# Patient Record
Sex: MALE | Race: WHITE | NOT HISPANIC OR LATINO | Employment: OTHER | URBAN - METROPOLITAN AREA
[De-identification: names, ages, dates, MRNs, and addresses within clinical notes are randomized per-mention and may not be internally consistent; named-entity substitution may affect disease eponyms.]

---

## 2023-12-30 PROCEDURE — 87661 TRICHOMONAS VAGINALIS AMPLIF: CPT

## 2023-12-30 PROCEDURE — 87800 DETECT AGNT MULT DNA DIREC: CPT

## 2023-12-30 PROCEDURE — 87591 N.GONORRHOEAE DNA AMP PROB: CPT

## 2023-12-30 PROCEDURE — 87491 CHLMYD TRACH DNA AMP PROBE: CPT

## 2023-12-31 ENCOUNTER — LAB REQUISITION (OUTPATIENT)
Dept: LAB | Facility: HOSPITAL | Age: 63
End: 2023-12-31
Payer: COMMERCIAL

## 2023-12-31 DIAGNOSIS — R39.12 POOR URINARY STREAM: ICD-10-CM

## 2023-12-31 LAB
C TRACH RRNA SPEC QL NAA+PROBE: NEGATIVE
N GONORRHOEA DNA SPEC QL PROBE+SIG AMP: NEGATIVE
T VAGINALIS RRNA SPEC QL NAA+PROBE: NEGATIVE

## 2024-01-19 ENCOUNTER — HOSPITAL ENCOUNTER (OUTPATIENT)
Dept: RADIOLOGY | Facility: CLINIC | Age: 64
Discharge: HOME | End: 2024-01-19
Payer: COMMERCIAL

## 2024-01-19 DIAGNOSIS — R05.8 PRODUCTIVE COUGH: ICD-10-CM

## 2024-01-19 PROCEDURE — 71046 X-RAY EXAM CHEST 2 VIEWS: CPT | Performed by: RADIOLOGY

## 2024-01-19 PROCEDURE — 71046 X-RAY EXAM CHEST 2 VIEWS: CPT

## 2025-01-13 ENCOUNTER — APPOINTMENT (OUTPATIENT)
Dept: PRIMARY CARE | Facility: CLINIC | Age: 65
End: 2025-01-13
Payer: COMMERCIAL

## 2025-01-13 VITALS
HEIGHT: 68 IN | BODY MASS INDEX: 27.58 KG/M2 | WEIGHT: 182 LBS | DIASTOLIC BLOOD PRESSURE: 78 MMHG | SYSTOLIC BLOOD PRESSURE: 128 MMHG | HEART RATE: 59 BPM | TEMPERATURE: 97.9 F | OXYGEN SATURATION: 97 %

## 2025-01-13 DIAGNOSIS — Z21 HIV INFECTION, UNSPECIFIED SYMPTOM STATUS: Primary | ICD-10-CM

## 2025-01-13 DIAGNOSIS — F41.9 ANXIETY: ICD-10-CM

## 2025-01-13 DIAGNOSIS — Z12.11 SCREENING FOR COLON CANCER: ICD-10-CM

## 2025-01-13 DIAGNOSIS — Z12.5 PROSTATE CANCER SCREENING: ICD-10-CM

## 2025-01-13 DIAGNOSIS — G25.81 RLS (RESTLESS LEGS SYNDROME): ICD-10-CM

## 2025-01-13 DIAGNOSIS — E78.2 MIXED HYPERLIPIDEMIA: ICD-10-CM

## 2025-01-13 PROCEDURE — 3008F BODY MASS INDEX DOCD: CPT | Performed by: FAMILY MEDICINE

## 2025-01-13 PROCEDURE — 99203 OFFICE O/P NEW LOW 30 MIN: CPT | Performed by: FAMILY MEDICINE

## 2025-01-13 RX ORDER — BICTEGRAVIR SODIUM, EMTRICITABINE, AND TENOFOVIR ALAFENAMIDE FUMARATE 50; 200; 25 MG/1; MG/1; MG/1
1 TABLET ORAL
COMMUNITY
Start: 2024-11-06

## 2025-01-13 RX ORDER — FINASTERIDE 1 MG/1
1 TABLET, FILM COATED ORAL DAILY
COMMUNITY

## 2025-01-13 RX ORDER — TADALAFIL 5 MG/1
TABLET ORAL
COMMUNITY

## 2025-01-13 RX ORDER — SIMVASTATIN 40 MG/1
TABLET, FILM COATED ORAL
COMMUNITY

## 2025-01-13 RX ORDER — PRAMIPEXOLE DIHYDROCHLORIDE 1.5 MG/1
1.5 TABLET ORAL NIGHTLY
COMMUNITY

## 2025-01-13 RX ORDER — PAROXETINE 10 MG/1
15 TABLET, FILM COATED ORAL DAILY
COMMUNITY

## 2025-01-13 ASSESSMENT — PATIENT HEALTH QUESTIONNAIRE - PHQ9
SUM OF ALL RESPONSES TO PHQ9 QUESTIONS 1 AND 2: 0
2. FEELING DOWN, DEPRESSED OR HOPELESS: NOT AT ALL
1. LITTLE INTEREST OR PLEASURE IN DOING THINGS: NOT AT ALL

## 2025-01-13 NOTE — PROGRESS NOTES
"Subjective   Patient ID: Drew Mckee is a 64 y.o. male who presents for New Patient Visit, medication check, and Medication  refills (EP. Here for restless leg syndrome, anxiety and HIV. Medication check and refills.).  HPI  New patinet- change in ins so needs new PCP  H/o HIV, HL, RLS     Thinks he is due for colonoscopy - last one 2020   Review of Systems  Genl: The patient has been in good health without recent weight change, fevers, or night sweats  CVS: No chest pain, irregular heartbeat, shortness of breath, or swollen ankles  Resp: No cough or difficulty breathing  GI: No change in bowel habits or abdominal pain. No heartburn  : No urinary problems.   Objective   /78 (BP Location: Left arm)   Pulse 59   Temp 36.6 °C (97.9 °F) (Oral)   Ht 1.727 m (5' 8\")   Wt 82.6 kg (182 lb)   SpO2 97%   BMI 27.67 kg/m²    Physical Exam  Alert, well-appearing.    HEENT: OP clear. Sclera white. Pupils equal and round. EACs and TMs clear.    Neck: Supple. No palpable masses. Thyroid was not enlarged.    CVS: RRR, no murmurs. No peripheral edema.    Respiratory: Clear and equal breath sounds.    GI: Soft, nontender, no masses or hepatosplenomegaly.     Assessment/Plan   Diagnoses and all orders for this visit:  HIV infection, unspecified symptom status  -     Referral to Infectious Disease; Future  -     CBC and Auto Differential; Future  -     Comprehensive Metabolic Panel; Future  -     TSH with reflex to Free T4 if abnormal; Future  RLS (restless legs syndrome)  -     CBC and Auto Differential; Future  -     Comprehensive Metabolic Panel; Future  -     TSH with reflex to Free T4 if abnormal; Future  Anxiety  Mixed hyperlipidemia  -     Lipid Panel; Future  Prostate cancer screening  -     Prostate Specific Antigen, Screen; Future  Screening for colon cancer  -     Colonoscopy Screening; Average Risk Patient; Future      Shannan Poon MD  Family Medicine   [facility]  "

## 2025-01-14 ENCOUNTER — LAB (OUTPATIENT)
Dept: LAB | Facility: LAB | Age: 65
End: 2025-01-14
Payer: COMMERCIAL

## 2025-01-14 DIAGNOSIS — Z21 HIV INFECTION, UNSPECIFIED SYMPTOM STATUS: ICD-10-CM

## 2025-01-14 DIAGNOSIS — Z12.5 PROSTATE CANCER SCREENING: ICD-10-CM

## 2025-01-14 DIAGNOSIS — G25.81 RLS (RESTLESS LEGS SYNDROME): ICD-10-CM

## 2025-01-14 DIAGNOSIS — E78.2 MIXED HYPERLIPIDEMIA: ICD-10-CM

## 2025-01-14 LAB
ALBUMIN SERPL BCP-MCNC: 4.3 G/DL (ref 3.4–5)
ALP SERPL-CCNC: 61 U/L (ref 33–136)
ALT SERPL W P-5'-P-CCNC: 31 U/L (ref 10–52)
ANION GAP SERPL CALC-SCNC: 16 MMOL/L (ref 10–20)
AST SERPL W P-5'-P-CCNC: 28 U/L (ref 9–39)
BASOPHILS # BLD AUTO: 0.02 X10*3/UL (ref 0–0.1)
BASOPHILS NFR BLD AUTO: 0.4 %
BILIRUB SERPL-MCNC: 1 MG/DL (ref 0–1.2)
BUN SERPL-MCNC: 15 MG/DL (ref 6–23)
CALCIUM SERPL-MCNC: 9.7 MG/DL (ref 8.6–10.6)
CHLORIDE SERPL-SCNC: 103 MMOL/L (ref 98–107)
CHOLEST SERPL-MCNC: 162 MG/DL (ref 0–199)
CHOLESTEROL/HDL RATIO: 3.4
CO2 SERPL-SCNC: 24 MMOL/L (ref 21–32)
CREAT SERPL-MCNC: 1.2 MG/DL (ref 0.5–1.3)
EGFRCR SERPLBLD CKD-EPI 2021: 68 ML/MIN/1.73M*2
EOSINOPHIL # BLD AUTO: 0.16 X10*3/UL (ref 0–0.7)
EOSINOPHIL NFR BLD AUTO: 3 %
ERYTHROCYTE [DISTWIDTH] IN BLOOD BY AUTOMATED COUNT: 12.4 % (ref 11.5–14.5)
GLUCOSE SERPL-MCNC: 96 MG/DL (ref 74–99)
HCT VFR BLD AUTO: 45 % (ref 41–52)
HDLC SERPL-MCNC: 47.9 MG/DL
HGB BLD-MCNC: 15.1 G/DL (ref 13.5–17.5)
IMM GRANULOCYTES # BLD AUTO: 0.01 X10*3/UL (ref 0–0.7)
IMM GRANULOCYTES NFR BLD AUTO: 0.2 % (ref 0–0.9)
LDLC SERPL CALC-MCNC: 92 MG/DL
LYMPHOCYTES # BLD AUTO: 2.57 X10*3/UL (ref 1.2–4.8)
LYMPHOCYTES NFR BLD AUTO: 48 %
MCH RBC QN AUTO: 31.9 PG (ref 26–34)
MCHC RBC AUTO-ENTMCNC: 33.6 G/DL (ref 32–36)
MCV RBC AUTO: 95 FL (ref 80–100)
MONOCYTES # BLD AUTO: 0.41 X10*3/UL (ref 0.1–1)
MONOCYTES NFR BLD AUTO: 7.7 %
NEUTROPHILS # BLD AUTO: 2.18 X10*3/UL (ref 1.2–7.7)
NEUTROPHILS NFR BLD AUTO: 40.7 %
NON HDL CHOLESTEROL: 114 MG/DL (ref 0–149)
NRBC BLD-RTO: 0 /100 WBCS (ref 0–0)
PLATELET # BLD AUTO: 150 X10*3/UL (ref 150–450)
POTASSIUM SERPL-SCNC: 4.5 MMOL/L (ref 3.5–5.3)
PROT SERPL-MCNC: 6.8 G/DL (ref 6.4–8.2)
PSA SERPL-MCNC: 0.39 NG/ML
RBC # BLD AUTO: 4.74 X10*6/UL (ref 4.5–5.9)
SODIUM SERPL-SCNC: 138 MMOL/L (ref 136–145)
TRIGL SERPL-MCNC: 111 MG/DL (ref 0–149)
TSH SERPL-ACNC: 1.33 MIU/L (ref 0.44–3.98)
VLDL: 22 MG/DL (ref 0–40)
WBC # BLD AUTO: 5.4 X10*3/UL (ref 4.4–11.3)

## 2025-01-14 PROCEDURE — 85025 COMPLETE CBC W/AUTO DIFF WBC: CPT

## 2025-01-14 PROCEDURE — 80053 COMPREHEN METABOLIC PANEL: CPT

## 2025-01-14 PROCEDURE — 80061 LIPID PANEL: CPT

## 2025-01-14 PROCEDURE — 84443 ASSAY THYROID STIM HORMONE: CPT

## 2025-01-14 PROCEDURE — 84153 ASSAY OF PSA TOTAL: CPT

## 2025-01-17 ENCOUNTER — TELEPHONE (OUTPATIENT)
Dept: IMMUNOLOGY | Facility: CLINIC | Age: 65
End: 2025-01-17
Payer: COMMERCIAL

## 2025-01-17 NOTE — TELEPHONE ENCOUNTER
Time Spent: 30 mins   EIS reached out to pt to return call from 01/16/2025. Pt available to answer call.  Pt requests to transfer care from [facility] d/t new insurance coverage   Pt stated best NPV availability beginning 02/17/2024   EIS called [facility] ID MD (Dr. KAYLA Roberson) office to request refill   [facility] ID  confirmed pt rx sent [facility]   EIS called pt to confirm medication pickup   Pt current rx sent to [facility]  Pt insurance can accept rx thorugh [facility]   Pt will f/U with [facility] FERNANDA SINGH request new rx     PLAN:   EIS will f/U with pt to schedule NPV according to pt availability (February 2025)   EIS will f/U to confirm medication pickup   EIS will complete RW

## 2025-01-20 ENCOUNTER — TELEPHONE (OUTPATIENT)
Dept: IMMUNOLOGY | Facility: CLINIC | Age: 65
End: 2025-01-20
Payer: COMMERCIAL

## 2025-01-20 NOTE — TELEPHONE ENCOUNTER
EIS - At Risk  SW received a VM from [facility] DESIREE Zimmer. SW called back and left a VM.    SW spoke with Mesha. Per Mesha, pt has [employer] insurance and would transfer care to . Mesha mentioned that pt's provider Dr. Roberson has sent a 90-day script (Biktarvy) to [facility]. Pt does not have [facility] with [facility] and [facility] SW is not sure if pt uses a copay card.    SW left pt a VM to ensure smooth transition,    Emailed supervisor to notify care transition.    Time: 6mins

## 2025-01-22 ENCOUNTER — TELEPHONE (OUTPATIENT)
Dept: PRIMARY CARE | Facility: CLINIC | Age: 65
End: 2025-01-22
Payer: COMMERCIAL

## 2025-01-22 NOTE — TELEPHONE ENCOUNTER
Pt called stating he was seen in office on 01/13/24 and his refills were not sent to [facility]. Please send.  Pt has a scheduled appt on 07/17/25.

## 2025-01-23 ENCOUNTER — PATIENT MESSAGE (OUTPATIENT)
Dept: PRIMARY CARE | Facility: CLINIC | Age: 65
End: 2025-01-23
Payer: COMMERCIAL

## 2025-01-23 DIAGNOSIS — L65.9 HAIR LOSS: ICD-10-CM

## 2025-01-23 DIAGNOSIS — G25.81 RLS (RESTLESS LEGS SYNDROME): ICD-10-CM

## 2025-01-23 DIAGNOSIS — E78.2 MIXED HYPERLIPIDEMIA: ICD-10-CM

## 2025-01-23 DIAGNOSIS — F41.9 ANXIETY: ICD-10-CM

## 2025-01-23 NOTE — TELEPHONE ENCOUNTER
Called and spoke to pt asking  for the medications he needs refilled and he will send screen shots of meds he needs  to [facility]..

## 2025-01-24 RX ORDER — PRAMIPEXOLE DIHYDROCHLORIDE 1.5 MG/1
1.5 TABLET ORAL NIGHTLY
Qty: 90 TABLET | Refills: 1 | Status: SHIPPED | OUTPATIENT
Start: 2025-01-24

## 2025-01-24 RX ORDER — PAROXETINE 10 MG/1
15 TABLET, FILM COATED ORAL DAILY
Qty: 135 TABLET | Refills: 1 | Status: SHIPPED | OUTPATIENT
Start: 2025-01-24

## 2025-01-24 RX ORDER — FINASTERIDE 1 MG/1
1 TABLET, FILM COATED ORAL DAILY
Qty: 90 TABLET | Refills: 1 | Status: SHIPPED | OUTPATIENT
Start: 2025-01-24

## 2025-01-24 RX ORDER — SIMVASTATIN 10 MG/1
10 TABLET, FILM COATED ORAL NIGHTLY
Qty: 90 TABLET | Refills: 1 | Status: SHIPPED | OUTPATIENT
Start: 2025-01-24 | End: 2026-02-28

## 2025-02-14 RX ORDER — VIT C/E/ZN/COPPR/LUTEIN/ZEAXAN 250MG-90MG
1 CAPSULE ORAL
COMMUNITY

## 2025-02-14 RX ORDER — ASCORBIC ACID 500 MG
500 TABLET ORAL
COMMUNITY

## 2025-02-17 ENCOUNTER — HOSPITAL ENCOUNTER (OUTPATIENT)
Dept: GASTROENTEROLOGY | Facility: HOSPITAL | Age: 65
Discharge: HOME | End: 2025-02-17
Payer: COMMERCIAL

## 2025-02-17 ENCOUNTER — ANESTHESIA EVENT (OUTPATIENT)
Dept: GASTROENTEROLOGY | Facility: HOSPITAL | Age: 65
End: 2025-02-17
Payer: COMMERCIAL

## 2025-02-17 ENCOUNTER — ANESTHESIA (OUTPATIENT)
Dept: GASTROENTEROLOGY | Facility: HOSPITAL | Age: 65
End: 2025-02-17
Payer: COMMERCIAL

## 2025-02-17 VITALS
DIASTOLIC BLOOD PRESSURE: 72 MMHG | HEIGHT: 68 IN | RESPIRATION RATE: 16 BRPM | HEART RATE: 74 BPM | BODY MASS INDEX: 26.37 KG/M2 | TEMPERATURE: 97.5 F | OXYGEN SATURATION: 98 % | WEIGHT: 174 LBS | SYSTOLIC BLOOD PRESSURE: 108 MMHG

## 2025-02-17 DIAGNOSIS — Z12.11 SCREENING FOR COLON CANCER: ICD-10-CM

## 2025-02-17 PROBLEM — F41.1 GENERALIZED ANXIETY DISORDER: Status: ACTIVE | Noted: 2025-02-17

## 2025-02-17 PROBLEM — B20 HUMAN IMMUNODEFICIENCY VIRUS (HIV) DISEASE (MULTI): Status: ACTIVE | Noted: 2024-06-03

## 2025-02-17 PROCEDURE — 2500000004 HC RX 250 GENERAL PHARMACY W/ HCPCS (ALT 636 FOR OP/ED): Performed by: NURSE ANESTHETIST, CERTIFIED REGISTERED

## 2025-02-17 PROCEDURE — 88305 TISSUE EXAM BY PATHOLOGIST: CPT | Mod: TC,PARLAB | Performed by: INTERNAL MEDICINE

## 2025-02-17 PROCEDURE — 45385 COLONOSCOPY W/LESION REMOVAL: CPT | Performed by: INTERNAL MEDICINE

## 2025-02-17 PROCEDURE — 7100000010 HC PHASE TWO TIME - EACH INCREMENTAL 1 MINUTE

## 2025-02-17 PROCEDURE — 93005 ELECTROCARDIOGRAM TRACING: CPT | Performed by: INTERNAL MEDICINE

## 2025-02-17 PROCEDURE — 3700000002 HC GENERAL ANESTHESIA TIME - EACH INCREMENTAL 1 MINUTE

## 2025-02-17 PROCEDURE — 7100000009 HC PHASE TWO TIME - INITIAL BASE CHARGE

## 2025-02-17 PROCEDURE — 3700000001 HC GENERAL ANESTHESIA TIME - INITIAL BASE CHARGE

## 2025-02-17 RX ORDER — PROPOFOL 10 MG/ML
INJECTION, EMULSION INTRAVENOUS AS NEEDED
Status: DISCONTINUED | OUTPATIENT
Start: 2025-02-17 | End: 2025-02-17

## 2025-02-17 RX ADMIN — PROPOFOL 30 MG: 10 INJECTION, EMULSION INTRAVENOUS at 14:42

## 2025-02-17 RX ADMIN — PROPOFOL 40 MG: 10 INJECTION, EMULSION INTRAVENOUS at 14:36

## 2025-02-17 RX ADMIN — PROPOFOL 250 MCG/KG/MIN: 10 INJECTION, EMULSION INTRAVENOUS at 14:37

## 2025-02-17 SDOH — HEALTH STABILITY: MENTAL HEALTH: CURRENT SMOKER: 0

## 2025-02-17 ASSESSMENT — PAIN - FUNCTIONAL ASSESSMENT
PAIN_FUNCTIONAL_ASSESSMENT: 0-10
PAIN_FUNCTIONAL_ASSESSMENT: 0-10

## 2025-02-17 ASSESSMENT — PAIN SCALES - GENERAL
PAINLEVEL_OUTOF10: 0 - NO PAIN
PAIN_LEVEL: 0
PAINLEVEL_OUTOF10: 0 - NO PAIN

## 2025-02-17 NOTE — H&P
Outpatient Hospital Procedure    Patient Profile-Procedures  Initial Info  Patient Demographics  Name Drew Mckee  Date of Birth 1960  MRN [id number]  Address   [address] [id number] [place] [address]  [place] [address]    Primary Phone Number [phone]  Secondary Phone Number    PCP Shanann Poon    Procedures   Colonoscopy      Indication:  H/o colon polyps       Primary contact name and number   Extended Emergency Contact Information  Primary Emergency Contact: DARREL MCKEE  Home Phone: [phone]  Work Phone: [phone]  Mobile Phone: [phone]  Relation: Relative  Preferred language: English   needed? No    General Health  Weight   Vitals:    02/17/25 1255   Weight: 78.9 kg (174 lb)     BMI Body mass index is 26.46 kg/m².    Allergies  No Known Allergies    Past Medical History   History reviewed. No pertinent past medical history.    Provider assessment  Diagnosis  Medication Reviewed - yes  Prior to Admission medications    Medication Sig Start Date End Date Taking? Authorizing Provider   ascorbic acid (Vitamin C) 500 mg tablet Take 1 tablet (500 mg) by mouth once daily.   Yes Historical Provider, MD   tijjkxhzu-ciampcth-incrkly ala (Biktarvy) -25 mg tablet Take 1 tablet by mouth once daily. 11/6/24  Yes Historical Provider, MD   cholecalciferol (Vitamin D-3) 25 MCG (1000 UT) capsule Take 1 capsule (25 mcg) by mouth once daily.   Yes Historical Provider, MD   finasteride (Propecia) 1 mg tablet Take 1 tablet (1 mg) by mouth once daily. 1/24/25  Yes Shannan Poon MD   PARoxetine (Paxil) 10 mg tablet Take 1.5 tablets (15 mg) by mouth once daily. 1/24/25  Yes Shannan Poon MD   pramipexole (Mirapex) 1.5 mg tablet Take 1 tablet (1.5 mg) by mouth once daily at bedtime. 1/24/25  Yes Shannan Poon MD   simvastatin (Zocor) 10 mg tablet Take 1 tablet (10 mg) by mouth once daily at bedtime. 1/24/25 2/28/26 Yes Shannan Poon MD   tadalafil (Cialis) 5 mg tablet TAKE 1  TABLET ONCE DAILY ASNEEDED    Historical Provider, MD   simvastatin (Zocor) 40 mg tablet Take by mouth.  Patient not taking: Reported on 2/17/2025 2/17/25  Historical Provider, MD       This is my H&P    Physical Exam  Physical Exam  Constitutional:       Appearance: Normal appearance.   HENT:      Head: Normocephalic.      Mouth/Throat:      Mouth: Mucous membranes are moist.   Eyes:      Extraocular Movements: Extraocular movements intact.      Pupils: Pupils are equal, round, and reactive to light.   Cardiovascular:      Rate and Rhythm: Normal rate and regular rhythm.      Pulses: Normal pulses.      Heart sounds: Normal heart sounds.   Pulmonary:      Effort: Pulmonary effort is normal.      Breath sounds: Normal breath sounds.   Abdominal:      General: Bowel sounds are normal.      Palpations: Abdomen is soft.   Neurological:      General: No focal deficit present.      Mental Status: He is alert.         ASA PS Classification 3  Sedation Plan Moderate  Procedure Plan - pre-procedural (re)assesment completed by physician:  discharge/transfer patient when discharge criteria met    Ramin Santana MD  2/17/2025 1:54 PM

## 2025-02-17 NOTE — ANESTHESIA PREPROCEDURE EVALUATION
Patient: Drew Mckee    Procedure Information       Date/Time: 02/17/25 1330    Scheduled providers: Ramin Santana MD    Procedure: COLONOSCOPY    Location: [facility]            Relevant Problems   Pulmonary   (+) CHELSIE (obstructive sleep apnea)      Neuro   (+) Generalized anxiety disorder      ID   (+) Human immunodeficiency virus (HIV) disease (Multi)       Clinical information reviewed:   Tobacco  Allergies  Meds  Problems  Med Hx  Surg Hx   Fam Hx  Soc   Hx        NPO Detail:  NPO/Void Status  Date of Last Liquid: 02/17/25  Time of Last Liquid: 1015  Date of Last Solid: 02/16/25  Time of Last Solid: 0800         Physical Exam    Airway  Mallampati: III  TM distance: <3 FB  Neck ROM: full     Cardiovascular - normal exam  Rhythm: regular  Rate: normal     Dental - normal exam     Pulmonary - normal exam     Abdominal            Anesthesia Plan    History of general anesthesia?: yes  History of complications of general anesthesia?: no    ASA 3     MAC     The patient is not a current smoker.  Education provided regarding risk of obstructive sleep apnea.  intravenous induction   Anesthetic plan and risks discussed with patient.    Plan discussed with [facility].

## 2025-02-17 NOTE — ANESTHESIA POSTPROCEDURE EVALUATION
Patient: Drew Mckee    Procedure Summary       Date: 02/17/25 Room / Location: [facility]    Anesthesia Start: 1433 Anesthesia Stop: 1504    Procedure: COLONOSCOPY Diagnosis: Screening for colon cancer    Scheduled Providers: Ramin Santana MD Responsible Provider: Reno Higgins MD    Anesthesia Type: MAC ASA Status: 3            Anesthesia Type: MAC    Vitals Value Taken Time   /74 02/17/25 1505   Temp 36.2 02/17/25 1505   Pulse 74 02/17/25 1505   Resp 12 02/17/25 1505   SpO2 96 02/17/25 1505       Anesthesia Post Evaluation    Patient location during evaluation: PACU  Patient participation: complete - patient participated  Level of consciousness: awake and alert  Pain score: 0  Pain management: adequate  Airway patency: patent  Cardiovascular status: acceptable  Respiratory status: acceptable  Hydration status: acceptable  Postoperative Nausea and Vomiting: none        There were no known notable events for this encounter.

## 2025-02-20 LAB
LABORATORY COMMENT REPORT: NORMAL
PATH REPORT.FINAL DX SPEC: NORMAL
PATH REPORT.GROSS SPEC: NORMAL
PATH REPORT.RELEVANT HX SPEC: NORMAL
PATH REPORT.TOTAL CANCER: NORMAL

## 2025-02-28 ENCOUNTER — CLINICAL SUPPORT (OUTPATIENT)
Dept: IMMUNOLOGY | Facility: CLINIC | Age: 65
End: 2025-02-28
Payer: COMMERCIAL

## 2025-02-28 DIAGNOSIS — Z77.21 PERSONAL HISTORY OF EXPOSURE TO POTENTIALLY HAZARDOUS BODY FLUIDS: ICD-10-CM

## 2025-02-28 DIAGNOSIS — B20 HUMAN IMMUNODEFICIENCY VIRUS (HIV) DISEASE (MULTI): ICD-10-CM

## 2025-02-28 LAB
BASOPHILS # BLD AUTO: 0.02 X10*3/UL (ref 0–0.1)
BASOPHILS NFR BLD AUTO: 0.4 %
CMV IGG AVIDITY SERPL IA-RTO: REACTIVE %
EOSINOPHIL # BLD AUTO: 0.18 X10*3/UL (ref 0–0.7)
EOSINOPHIL NFR BLD AUTO: 3.5 %
ERYTHROCYTE [DISTWIDTH] IN BLOOD BY AUTOMATED COUNT: 12.3 % (ref 11.5–14.5)
HAV AB SER QL IA: REACTIVE
HBV CORE AB SER QL: NONREACTIVE
HBV SURFACE AB SER-ACNC: 19.5 MIU/ML
HBV SURFACE AG SERPL QL IA: NONREACTIVE
HCT VFR BLD AUTO: 44.8 % (ref 41–52)
HCV AB SER QL: NONREACTIVE
HGB BLD-MCNC: 15.5 G/DL (ref 13.5–17.5)
IMM GRANULOCYTES # BLD AUTO: 0.01 X10*3/UL (ref 0–0.7)
IMM GRANULOCYTES NFR BLD AUTO: 0.2 % (ref 0–0.9)
LYMPHOCYTES # BLD AUTO: 2.29 X10*3/UL (ref 1.2–4.8)
LYMPHOCYTES NFR BLD AUTO: 44.3 %
MCH RBC QN AUTO: 32.6 PG (ref 26–34)
MCHC RBC AUTO-ENTMCNC: 34.6 G/DL (ref 32–36)
MCV RBC AUTO: 94 FL (ref 80–100)
MONOCYTES # BLD AUTO: 0.33 X10*3/UL (ref 0.1–1)
MONOCYTES NFR BLD AUTO: 6.4 %
NEUTROPHILS # BLD AUTO: 2.34 X10*3/UL (ref 1.2–7.7)
NEUTROPHILS NFR BLD AUTO: 45.2 %
NRBC BLD-RTO: 0 /100 WBCS (ref 0–0)
PLATELET # BLD AUTO: 116 X10*3/UL (ref 150–450)
RBC # BLD AUTO: 4.75 X10*6/UL (ref 4.5–5.9)
T GONDII IGG SER-ACNC: NONREACTIVE
TREPONEMA PALLIDUM IGG+IGM AB [PRESENCE] IN SERUM OR PLASMA BY IMMUNOASSAY: NONREACTIVE
WBC # BLD AUTO: 5.2 X10*3/UL (ref 4.4–11.3)

## 2025-02-28 PROCEDURE — 86481 TB AG RESPONSE T-CELL SUSP: CPT

## 2025-02-28 PROCEDURE — 86645 CMV ANTIBODY IGM: CPT

## 2025-02-28 PROCEDURE — 82960 TEST FOR G6PD ENZYME: CPT

## 2025-02-28 PROCEDURE — 86708 HEPATITIS A ANTIBODY: CPT

## 2025-02-28 PROCEDURE — 85025 COMPLETE CBC W/AUTO DIFF WBC: CPT

## 2025-02-28 PROCEDURE — 87340 HEPATITIS B SURFACE AG IA: CPT

## 2025-02-28 PROCEDURE — 86644 CMV ANTIBODY: CPT

## 2025-02-28 PROCEDURE — 86780 TREPONEMA PALLIDUM: CPT

## 2025-02-28 PROCEDURE — 87536 HIV-1 QUANT&REVRSE TRNSCRPJ: CPT

## 2025-02-28 PROCEDURE — 86803 HEPATITIS C AB TEST: CPT

## 2025-02-28 PROCEDURE — 86777 TOXOPLASMA ANTIBODY: CPT

## 2025-02-28 PROCEDURE — 86704 HEP B CORE ANTIBODY TOTAL: CPT

## 2025-02-28 PROCEDURE — 86706 HEP B SURFACE ANTIBODY: CPT

## 2025-02-28 PROCEDURE — 36415 COLL VENOUS BLD VENIPUNCTURE: CPT

## 2025-02-28 NOTE — PROGRESS NOTES
Patient here to establish care for HIV. Dx'd in Feb 2024, started on Biktarvy, receiving care at [facility] Has to transfer to  for insurance reasons. Pt has a PCP. Information given re: [facility]. Labs drawn without incident. Contact numbers given, pt told to call w/ any questions or concerns. Appt made w/ Dr. Jacobo for April 3.   
(3) no apparent problem

## 2025-03-01 LAB — G6PD RBC QL: NORMAL

## 2025-03-02 LAB
HIV1 RNA # PLAS NAA DL=20: ABNORMAL {COPIES}/ML
HIV1 RNA SPEC NAA+PROBE-LOG#: ABNORMAL {LOG_COPIES}/ML
NIL(NEG) CONTROL SPOT COUNT: NORMAL
PANEL A SPOT COUNT: 0
PANEL B SPOT COUNT: 1
POS CONTROL SPOT COUNT: NORMAL
T-SPOT. TB INTERPRETATION: NEGATIVE

## 2025-03-03 LAB — CMV IGM SERPL-ACNC: 12.1 AU/ML

## 2025-03-13 ENCOUNTER — OFFICE VISIT (OUTPATIENT)
Dept: IMMUNOLOGY | Facility: CLINIC | Age: 65
End: 2025-03-13
Payer: COMMERCIAL

## 2025-03-13 VITALS
HEART RATE: 56 BPM | RESPIRATION RATE: 21 BRPM | BODY MASS INDEX: 26.52 KG/M2 | SYSTOLIC BLOOD PRESSURE: 125 MMHG | DIASTOLIC BLOOD PRESSURE: 71 MMHG | WEIGHT: 174.4 LBS | OXYGEN SATURATION: 100 %

## 2025-03-13 DIAGNOSIS — Z00.00 HEALTHCARE MAINTENANCE: ICD-10-CM

## 2025-03-13 DIAGNOSIS — B20 HUMAN IMMUNODEFICIENCY VIRUS (HIV) DISEASE (MULTI): Primary | ICD-10-CM

## 2025-03-13 PROBLEM — D12.6 TUBULAR ADENOMA OF COLON: Status: RESOLVED | Noted: 2020-02-01 | Resolved: 2025-03-13

## 2025-03-13 PROBLEM — D12.6 TUBULAR ADENOMA OF COLON: Status: ACTIVE | Noted: 2020-02-01

## 2025-03-13 PROCEDURE — 1036F TOBACCO NON-USER: CPT | Performed by: INTERNAL MEDICINE

## 2025-03-13 PROCEDURE — 99205 OFFICE O/P NEW HI 60 MIN: CPT | Performed by: INTERNAL MEDICINE

## 2025-03-13 PROCEDURE — 90677 PCV20 VACCINE IM: CPT | Performed by: INTERNAL MEDICINE

## 2025-03-13 PROCEDURE — 86762 RUBELLA ANTIBODY: CPT | Performed by: INTERNAL MEDICINE

## 2025-03-13 PROCEDURE — 99417 PROLNG OP E/M EACH 15 MIN: CPT | Performed by: INTERNAL MEDICINE

## 2025-03-13 PROCEDURE — 87491 CHLMYD TRACH DNA AMP PROBE: CPT | Performed by: INTERNAL MEDICINE

## 2025-03-13 PROCEDURE — 86765 RUBEOLA ANTIBODY: CPT | Performed by: INTERNAL MEDICINE

## 2025-03-13 PROCEDURE — 36415 COLL VENOUS BLD VENIPUNCTURE: CPT | Performed by: INTERNAL MEDICINE

## 2025-03-13 PROCEDURE — 99215 OFFICE O/P EST HI 40 MIN: CPT | Mod: 25 | Performed by: INTERNAL MEDICINE

## 2025-03-13 RX ORDER — BICTEGRAVIR SODIUM, EMTRICITABINE, AND TENOFOVIR ALAFENAMIDE FUMARATE 50; 200; 25 MG/1; MG/1; MG/1
1 TABLET ORAL
Qty: 90 TABLET | Refills: 3 | Status: SHIPPED | OUTPATIENT
Start: 2025-03-13 | End: 2026-03-13

## 2025-03-13 ASSESSMENT — PAIN SCALES - GENERAL: PAINLEVEL_OUTOF10: 0-NO PAIN

## 2025-03-13 ASSESSMENT — ENCOUNTER SYMPTOMS
HEMATOLOGIC/LYMPHATIC NEGATIVE: 1
ALLERGIC/IMMUNOLOGIC NEGATIVE: 1
CARDIOVASCULAR NEGATIVE: 1
RESPIRATORY NEGATIVE: 1
CONSTITUTIONAL NEGATIVE: 1
DECREASED CONCENTRATION: 0
CONFUSION: 0
DYSPHORIC MOOD: 0
GASTROINTESTINAL NEGATIVE: 1
NEUROLOGICAL NEGATIVE: 1
MUSCULOSKELETAL NEGATIVE: 1
AGITATION: 0

## 2025-03-13 NOTE — ASSESSMENT & PLAN NOTE
Immunologically intact and virologically suppressed on current anti-retroviral agents.  Biktarvy renewed for one year prescription  Routine labs recently collected thus not ordered today.  Vaccines updated with Prevnar 20 today.    - We are checking Measles and Rubella Abs as his vaccine history may be incomplete as child. No recollection of these illnesses but did have chicken pox   STD screening performed, GC/CT NAAT throat, rectum, urine.  Will RTC in 6 months

## 2025-03-13 NOTE — LETTER
03/13/25    Shannan Poon MD  [address]   [facility]  [place] [address]      Dear Dr. Shannan Poon MD,    I am writing to confirm that your patient, Drew Mckee, received care in my office on 03/13/25. I have enclosed a summary of the care provided to Drew for your reference.    Please contact me with any questions you may have regarding the visit.    Sincerely,         Jaycee Jacobo MD  [address]  [address]  [place] [address] [phone]    CC: No Recipients

## 2025-03-13 NOTE — PATIENT INSTRUCTIONS
It was great to see you today!  Your last blood work that we did in clinic showed that your T-cells (CD4 count) was 1,237 / 54%.  Your virus was fully suppressed at less than 20 copies.  Keep up the great work taking your medications.  We collected routine blood work today.  I will see you back in clinic in 6 months.  If any issues should arise before then, please remember to call the clinic and get in contact with your nurse.    Today you received Prevnar 20, a pneumonia vaccine.  You will not require any further vaccines for pneumonia at this time, based on current recommendations.

## 2025-03-13 NOTE — PROGRESS NOTES
HIV Clinic Initial Visit Note    Drew Mckee is a 65 y.o. year old male being seen today for their initial visit in the [facility] for HIV infection.    Primary Care physician is Shannan Poon MD    Drew Mckee first tested positive 2/9/24 during screening to initiate PrEP.  The had last tested negative: 2/14/23  The lowest CD4 count known is/was 736/20% on 2/19/24 .   Began HAART (Biktary) on 2/23/24.  Most recent CD4 count is 1,237 / 54%   Current Viral load: <20       Highest viral load: 1,180,000    Prior HIV care at [facility] but had insurance change requiring transfer of care. Has PCP in  system, Dr. Poon.  Presented in 12/2023 with flu like symptoms and did test (+) for flu at the time.  After resolution, suffered weight loss poor appetite and early satiety.   He was testing yearly with last negative test in 2/2023. Partners male and female.  Wife passed from pancreatic cancer Nov 2023.    Currently sexually active with both male and female partners. All are made aware of diagnosis.  Involved in AI/AR, OI/OR activity.  No h/o STDs in past.    Tolerating medications well.  No missed doses.    Risk factor for HIV include (check all that apply):  male and female partners, multiple    Past HIV associated illnesses: select all that apply: None    Past Medical / Surgical History  Past Medical History:   Diagnosis Date    Tubular adenoma of colon 02/2020    4mm sessile polyp removed with biopsy in 2/2020          Family History   Problem Relation Name Age of Onset    Cancer Mother          lung    Hyperlipidemia Father       Social History     Socioeconomic History    Marital status:    Tobacco Use    Smoking status: Never    Smokeless tobacco: Never   Vaping Use    Vaping status: Never Used   Substance and Sexual Activity    Alcohol use: Yes     Alcohol/week: 10.0 standard drinks of alcohol     Types: 10 Standard drinks or equivalent per week     Comment: 10    Drug use: Never    Sexual activity: Defer     Social  Drivers of Health     Financial Resource Strain: Low Risk  (12/28/2024)    Received from [facility] [place]    Overall Financial Resource Strain (CARDIA)     Difficulty of Paying Living Expenses: Not hard at all   Food Insecurity: No Food Insecurity (12/28/2024)    Received from [facility] [place]    Hunger Vital Sign     Worried About Running Out of Food in the Last Year: Never true     Ran Out of Food in the Last Year: Never true   Transportation Needs: No Transportation Needs (12/28/2024)    Received from [facility] [place]    PRAPARE - Transportation     Lack of Transportation (Medical): No     Lack of Transportation (Non-Medical): No   Physical Activity: Sufficiently Active (12/28/2024)    Received from [facility] [place]    Exercise Vital Sign     Days of Exercise per Week: 3 days     Minutes of Exercise per Session: 80 min   Stress: No Stress Concern Present (12/28/2024)    Received from [facility] [place]    [employer] - Occupational Stress Questionnaire     Feeling of Stress : Only a little   Social Connections: Moderately Integrated (12/28/2024)    Received from [facility] [place]    Social Connection and Isolation Panel [NHANES]     Frequency of Communication with Friends and Family: More than three times a week     Frequency of Social Gatherings with Friends and Family: More than three times a week     Attends Mu-ism Services: 1 to 4 times per year     Active Member of Clubs or Organizations: Yes     Attends Club or Organization Meetings: More than 4 times per year     Marital Status:    Housing Stability: Low Risk  (10/20/2023)    Received from [facility] [place], [facility] [place]    Housing Stability Vital Sign     Unable to Pay for Housing in the Last Year: No     Number of Places Lived in the Last Year: 1     Unstable Housing in the Last Year: No       Past Employment: Retired but works part time, corporate board work  Previous Incarcerations: No  Pets: No, dog in household,  not his;  Current living situation: Owns home; daughter lives with him with her dog    No Known Allergies    Immunizations:  Immunization History   Administered Date(s) Administered    DT (pediatric) 06/15/1991    Flu vaccine, quadrivalent, no egg protein, age 6 month or greater (FLUCELVAX) 10/19/2022, 09/27/2023    Hep A / Hep B 09/30/2024    Hepatitis A vaccine, age 19 years and greater (HAVRIX) 06/03/2024    Hepatitis B vaccine, 18yrs and older(HEPLISAV) 06/03/2024    Influenza, Unspecified 11/12/2007, 10/01/2014    Influenza, seasonal, injectable 10/31/2000, 10/01/2017, 10/14/2021, 09/30/2024    Moderna COVID-19 vaccine, 12 years and older (50mcg/0.5mL)(Spikevax) 10/03/2023    Moderna SARS-CoV-2 Vaccination 03/01/2021, 04/06/2021, 11/11/2021, 05/19/2022, 07/09/2022    Novel influenza-H1N1-09, preservative-free 12/23/2009    Pfizer COVID-19 vaccine, 12 years and older, (30mcg/0.3mL) (Comirnaty) 09/30/2024    Pfizer COVID-19 vaccine, bivalent, age 12 years and older (30 mcg/0.3 mL) 02/14/2023    Pneumococcal conjugate vaccine, 20-valent (PREVNAR 20) 03/13/2025    RESPIRATORY SYNCYTIAL VIRUS (RSV), ELIGIBLE PREGNANT PTS, 0.5 ML (ABRYSVO) 01/23/2024    Small pox and monkeypox vaccine (Jynneos) *check dose/route* 06/03/2024    Td vaccine, age 7 years and older (TDVAX) 09/23/2005    Tdap vaccine, age 7 year and older (BOOSTRIX, ADACEL) 08/06/2013, 02/14/2023    Zoster vaccine, recombinant, adult (SHINGRIX) 06/22/2018, 12/12/2018       Past Medications taken for HIV include: Current regimen of Biktarvy    CURRENT MEDICATIONS:    Current Outpatient Medications:     ascorbic acid (Vitamin C) 500 mg tablet, Take 1 tablet (500 mg) by mouth once daily., Disp: , Rfl:     mvhhhexfu-cgcpkcyd-nkjdtbp ala (Biktarvy) -25 mg tablet, Take 1 tablet by mouth once daily., Disp: 90 tablet, Rfl: 3    cholecalciferol (Vitamin D-3) 25 MCG (1000 UT) capsule, Take 1 capsule (25 mcg) by mouth once daily., Disp: , Rfl:     finasteride  (Propecia) 1 mg tablet, Take 1 tablet (1 mg) by mouth once daily., Disp: 90 tablet, Rfl: 1    PARoxetine (Paxil) 10 mg tablet, Take 1.5 tablets (15 mg) by mouth once daily., Disp: 135 tablet, Rfl: 1    pramipexole (Mirapex) 1.5 mg tablet, Take 1 tablet (1.5 mg) by mouth once daily at bedtime., Disp: 90 tablet, Rfl: 1    simvastatin (Zocor) 10 mg tablet, Take 1 tablet (10 mg) by mouth once daily at bedtime., Disp: 90 tablet, Rfl: 1    tadalafil (Cialis) 5 mg tablet, TAKE 1 TABLET ONCE DAILY ASNEEDED, Disp: , Rfl:     OTC Meds or herbal supplements taken: Vitamins B12, C, D    Review of Symptoms  Review of Systems   Constitutional: Negative.    HENT: Negative.     Eyes:  Negative for visual disturbance.   Respiratory: Negative.     Cardiovascular: Negative.    Gastrointestinal: Negative.    Genitourinary: Negative.    Musculoskeletal: Negative.    Skin: Negative.    Allergic/Immunologic: Negative.    Neurological: Negative.    Hematological: Negative.    Psychiatric/Behavioral:  Negative for agitation, confusion, decreased concentration and dysphoric mood.        OBJECTIVE  Visit Vitals  /71 (BP Location: Right arm, Patient Position: Sitting)   Pulse 56   Resp 21   Wt 79.1 kg (174 lb 6.4 oz)   SpO2 100%   BMI 26.52 kg/m²   Smoking Status Never   BSA 1.95 m²       Physical Exam   Physical Exam  Vitals reviewed.   Constitutional:       Appearance: Normal appearance. He is normal weight.   HENT:      Head: Normocephalic and atraumatic.      Mouth/Throat:      Mouth: Mucous membranes are moist.      Pharynx: Oropharynx is clear.   Eyes:      Extraocular Movements: Extraocular movements intact.      Conjunctiva/sclera: Conjunctivae normal.      Pupils: Pupils are equal, round, and reactive to light.   Cardiovascular:      Rate and Rhythm: Normal rate and regular rhythm.      Pulses: Normal pulses.      Heart sounds: Normal heart sounds. No murmur heard.     No friction rub. No gallop.   Pulmonary:      Effort:  Pulmonary effort is normal.      Breath sounds: Normal breath sounds.   Abdominal:      General: Abdomen is flat. Bowel sounds are normal.      Palpations: Abdomen is soft. There is no mass.   Genitourinary:     Rectum: Normal.      Comments: Small skin tag on right buttock, not in gluteal cleft.  Musculoskeletal:         General: Normal range of motion.      Cervical back: Normal range of motion and neck supple.   Neurological:      General: No focal deficit present.      Mental Status: He is alert and oriented to person, place, and time.      Cranial Nerves: No cranial nerve deficit.         PERTINENT DATA:      CBC:  WBC   Date Value Ref Range Status   02/28/2025 5.2 4.4 - 11.3 x10*3/uL Final     nRBC   Date Value Ref Range Status   02/28/2025 0.0 0.0 - 0.0 /100 WBCs Final     RBC   Date Value Ref Range Status   02/28/2025 4.75 4.50 - 5.90 x10*6/uL Final     Hemoglobin   Date Value Ref Range Status   02/28/2025 15.5 13.5 - 17.5 g/dL Final     MCV   Date Value Ref Range Status   02/28/2025 94 80 - 100 fL Final     RDW   Date Value Ref Range Status   02/28/2025 12.3 11.5 - 14.5 % Final       Renal Function Panel:  Glucose   Date Value Ref Range Status   01/14/2025 96 74 - 99 mg/dL Final     Sodium   Date Value Ref Range Status   01/14/2025 138 136 - 145 mmol/L Final     Potassium   Date Value Ref Range Status   01/14/2025 4.5 3.5 - 5.3 mmol/L Final     Chloride   Date Value Ref Range Status   01/14/2025 103 98 - 107 mmol/L Final     Anion Gap   Date Value Ref Range Status   01/14/2025 16 10 - 20 mmol/L Final     Urea Nitrogen   Date Value Ref Range Status   01/14/2025 15 6 - 23 mg/dL Final     Creatinine   Date Value Ref Range Status   01/14/2025 1.20 0.50 - 1.30 mg/dL Final     eGFR   Date Value Ref Range Status   01/14/2025 68 >60 mL/min/1.73m*2 Final     Comment:     Calculations of estimated GFR are performed using the 2021 CKD-EPI Study Refit equation without the race variable for the IDMS-Traceable creatinine  methods.  https://jasn.asnjournals.org/content/early/2021/09/22/ASN.1881665227     Calcium   Date Value Ref Range Status   01/14/2025 9.7 8.6 - 10.6 mg/dL Final     Albumin   Date Value Ref Range Status   01/14/2025 4.3 3.4 - 5.0 g/dL Final       Hepatic Panel:  Albumin   Date Value Ref Range Status   01/14/2025 4.3 3.4 - 5.0 g/dL Final     Bilirubin, Total   Date Value Ref Range Status   01/14/2025 1.0 0.0 - 1.2 mg/dL Final     Alkaline Phosphatase   Date Value Ref Range Status   01/14/2025 61 33 - 136 U/L Final     ALT   Date Value Ref Range Status   01/14/2025 31 10 - 52 U/L Final     Comment:     Patients treated with Sulfasalazine may generate falsely decreased results for ALT.       HIV Viral Load:  HIV-1 RNA PCR Viral Load Log   Date Value Ref Range Status   02/28/2025   Final     Comment:     Not calculated     HIV RNA Result   Date Value Ref Range Status   02/28/2025 <20 Detected (A) Not Detected Final       CD4 Count:  CD3+CD4+%   Date Value Ref Range Status   02/28/2025 54 29 - 57 % Final     CD3+CD4+ Absolute   Date Value Ref Range Status   02/28/2025 1.237 0.350 - 2.740 x10E9/L Final     CD3+CD8+%   Date Value Ref Range Status   02/28/2025 26 7 - 31 % Final     CD3+CD8+ Absolute   Date Value Ref Range Status   02/28/2025 0.595 0.080 - 1.490 x10E9/L Final     CD4/CD8 Ratio   Date Value Ref Range Status   02/28/2025 2.08 1.00 - 2.60 Final       ASSESSMENT / PLAN:  65 y.o. year old male establishing care today.     Problem List Items Addressed This Visit       Human immunodeficiency virus (HIV) disease (Multi) - Primary    Overview     HIV history entered by HIV care provider: PLEASE DO NOT DELETE     Initial diagnosis:  2/9/24 ([facility] [place] Partners M/F  CD4 raffy of 736 on 2/19/24 with associated VL of 1,180,000.    Pertinent Screens:  HAV Ab:  negative 2/19/24 ---> reactive 2/28/25 post vaccination  HBsAg: Non-reactive  2/19/24, 2/28/25  HBsAb: <8 2/19/24 --> 19.5 2/287/25 post-vaccination  HBcAb:   Non reactive 2/19/24  HCV Ab: Non-reactive 2/19/24, 2/28/25  CMV IgG:  Reactive  2/28/25  Toxo IgG:  Negative  2/28/25  G6PD:  Normal 2/28/25  HLA B57-01: Pending  2/28/25  Syphilis IgG:  Non-reactive 2/28/25  PPD/IGRA:  T-spot Negative 2/28/25  HIV Genotype: 2/19/24 ([facility]): No major mutations identified  Minor RT mutations: K173Q, I178M, T200A, Q207E, R211K, P243T, V245E, E248N, E248D, D250N, M357T, A360T,   A376S, S379A, T386I, K390R, T403K, K431R, V435I   Major Mutation Hx: None    HAART Hx:  Biktarvy:  2/23/24 - current    HIV associated illnesses/Yevgeniy:  None          Current Assessment & Plan     Immunologically intact and virologically suppressed on current anti-retroviral agents.  Biktarvy renewed for one year prescription  Routine labs recently collected thus not ordered today.  Vaccines updated with Prevnar 20 today.    - We are checking Measles and Rubella Abs as his vaccine history may be incomplete as child. No recollection of these illnesses but did have chicken pox   STD screening performed, GC/CT NAAT throat, rectum, urine.  Will RTC in 6 months           Relevant Medications    qbnleuebl-xqkdlitg-xirprqi ala (Biktarvy) -25 mg tablet    Other Relevant Orders    Pneumococcal conjugate vaccine, 20-valent (PREVNAR 20) (Completed)    C. trachomatis / N. gonorrhoeae, Amplified, Throat    C. trachomatis / N. gonorrhoeae, Amplified, Urogenital    C. trachomatis / N. gonorrhoeae, Amplified, Rectal     Other Visit Diagnoses       Healthcare maintenance        Relevant Orders    Rubella Antibody, IgG    Rubeola Antibody, IgG            Jaycee Jacobo MD

## 2025-03-14 LAB
C TRACH RRNA SPEC QL NAA+PROBE: NEGATIVE
DNA HLA-B5701: NEGATIVE
MEV IGG SER QL IA: POSITIVE
N GONORRHOEA DNA SPEC QL PROBE+SIG AMP: NEGATIVE
RUBEOLA IGG ANTIBODY INDEX: 6.8 IA
RUBV IGG SERPL IA-ACNC: 3.2 IA
RUBV IGG SERPL QL IA: POSITIVE

## 2025-03-19 LAB
ATRIAL RATE: 49 BPM
P AXIS: 22 DEGREES
P OFFSET: 176 MS
P ONSET: 125 MS
PR INTERVAL: 192 MS
Q ONSET: 221 MS
QRS COUNT: 8 BEATS
QRS DURATION: 80 MS
QT INTERVAL: 438 MS
QTC CALCULATION(BAZETT): 395 MS
QTC FREDERICIA: 409 MS
R AXIS: -5 DEGREES
T AXIS: 86 DEGREES
T OFFSET: 440 MS
VENTRICULAR RATE: 49 BPM

## 2025-04-03 ENCOUNTER — APPOINTMENT (OUTPATIENT)
Dept: IMMUNOLOGY | Facility: CLINIC | Age: 65
End: 2025-04-03
Payer: COMMERCIAL

## 2025-04-10 DIAGNOSIS — B20 HUMAN IMMUNODEFICIENCY VIRUS (HIV) DISEASE (MULTI): ICD-10-CM

## 2025-04-10 RX ORDER — BICTEGRAVIR SODIUM, EMTRICITABINE, AND TENOFOVIR ALAFENAMIDE FUMARATE 50; 200; 25 MG/1; MG/1; MG/1
1 TABLET ORAL DAILY
Qty: 90 TABLET | Refills: 3 | Status: SHIPPED | OUTPATIENT
Start: 2025-04-10

## 2025-06-25 DIAGNOSIS — E78.2 MIXED HYPERLIPIDEMIA: ICD-10-CM

## 2025-06-30 DIAGNOSIS — F41.9 ANXIETY: ICD-10-CM

## 2025-06-30 DIAGNOSIS — E78.2 MIXED HYPERLIPIDEMIA: ICD-10-CM

## 2025-06-30 DIAGNOSIS — G25.81 RLS (RESTLESS LEGS SYNDROME): ICD-10-CM

## 2025-06-30 RX ORDER — PRAMIPEXOLE DIHYDROCHLORIDE 1.5 MG/1
1.5 TABLET ORAL NIGHTLY
Qty: 90 TABLET | Refills: 0 | Status: SHIPPED | OUTPATIENT
Start: 2025-06-30

## 2025-06-30 RX ORDER — SIMVASTATIN 10 MG/1
10 TABLET, FILM COATED ORAL NIGHTLY
Qty: 90 TABLET | Refills: 0 | Status: SHIPPED | OUTPATIENT
Start: 2025-06-30 | End: 2026-08-04

## 2025-06-30 RX ORDER — PAROXETINE 10 MG/1
15 TABLET, FILM COATED ORAL DAILY
Qty: 135 TABLET | Refills: 0 | Status: SHIPPED | OUTPATIENT
Start: 2025-06-30

## 2025-06-30 NOTE — TELEPHONE ENCOUNTER
[facility] didn’t refill 3 of my prescriptions last week.  They request new prescription orders. See attached automated messages. Can your team help with this?   Attachments   [id number]     [id number]     [id number]      Pt needs Paroxetine 10 mg, Simvastatin 10mg, and Pramipexole 1.5 mg.  Last appt-1/13/25  Next appt-7/17/25  Last BW-2/28/25

## 2025-07-17 ENCOUNTER — APPOINTMENT (OUTPATIENT)
Dept: PRIMARY CARE | Facility: CLINIC | Age: 65
End: 2025-07-17
Payer: COMMERCIAL

## 2025-07-17 VITALS
SYSTOLIC BLOOD PRESSURE: 112 MMHG | WEIGHT: 171.9 LBS | HEART RATE: 63 BPM | DIASTOLIC BLOOD PRESSURE: 70 MMHG | BODY MASS INDEX: 26.05 KG/M2 | RESPIRATION RATE: 16 BRPM | OXYGEN SATURATION: 99 % | TEMPERATURE: 98.5 F | HEIGHT: 68 IN

## 2025-07-17 DIAGNOSIS — Z00.00 HEALTH MAINTENANCE EXAMINATION: ICD-10-CM

## 2025-07-17 DIAGNOSIS — G25.81 RLS (RESTLESS LEGS SYNDROME): ICD-10-CM

## 2025-07-17 DIAGNOSIS — F41.9 ANXIETY: ICD-10-CM

## 2025-07-17 DIAGNOSIS — E78.2 MIXED HYPERLIPIDEMIA: Primary | ICD-10-CM

## 2025-07-17 PROBLEM — J02.9 SORE THROAT: Status: ACTIVE | Noted: 2025-07-17

## 2025-07-17 PROBLEM — L85.1 ACQUIRED KERATODERMA: Status: ACTIVE | Noted: 2025-07-17

## 2025-07-17 PROBLEM — S69.92XA INJURY OF LEFT WRIST: Status: ACTIVE | Noted: 2025-07-17

## 2025-07-17 PROBLEM — R30.0 DYSURIA: Status: ACTIVE | Noted: 2025-07-17

## 2025-07-17 PROBLEM — R97.20 ELEVATED PROSTATE SPECIFIC ANTIGEN (PSA): Status: ACTIVE | Noted: 2025-07-17

## 2025-07-17 PROBLEM — J02.9 ACUTE PHARYNGITIS: Status: ACTIVE | Noted: 2025-07-17

## 2025-07-17 PROBLEM — D12.6 TUBULAR ADENOMA OF COLON: Status: ACTIVE | Noted: 2025-07-17

## 2025-07-17 PROBLEM — H60.91 RIGHT OTITIS EXTERNA: Status: ACTIVE | Noted: 2025-07-17

## 2025-07-17 PROBLEM — M54.16 RADICULOPATHY, LUMBAR REGION: Status: ACTIVE | Noted: 2025-07-17

## 2025-07-17 LAB
ALBUMIN SERPL-MCNC: 4.4 G/DL (ref 3.6–5.1)
ALP SERPL-CCNC: 68 U/L (ref 35–144)
ALT SERPL-CCNC: 28 U/L (ref 9–46)
ANION GAP SERPL CALCULATED.4IONS-SCNC: 9 MMOL/L (CALC) (ref 7–17)
AST SERPL-CCNC: 26 U/L (ref 10–35)
BILIRUB SERPL-MCNC: 0.9 MG/DL (ref 0.2–1.2)
BUN SERPL-MCNC: 20 MG/DL (ref 7–25)
CALCIUM SERPL-MCNC: 9.6 MG/DL (ref 8.6–10.3)
CHLORIDE SERPL-SCNC: 101 MMOL/L (ref 98–110)
CHOLEST SERPL-MCNC: 189 MG/DL
CHOLEST/HDLC SERPL: 3.9 (CALC)
CO2 SERPL-SCNC: 28 MMOL/L (ref 20–32)
CREAT SERPL-MCNC: 1.12 MG/DL (ref 0.7–1.35)
EGFRCR SERPLBLD CKD-EPI 2021: 74 ML/MIN/1.73M2
GLUCOSE SERPL-MCNC: 83 MG/DL (ref 65–99)
HDLC SERPL-MCNC: 48 MG/DL
LDLC SERPL CALC-MCNC: 107 MG/DL (CALC)
NONHDLC SERPL-MCNC: 141 MG/DL (CALC)
POTASSIUM SERPL-SCNC: 4.2 MMOL/L (ref 3.5–5.3)
PROT SERPL-MCNC: 7 G/DL (ref 6.1–8.1)
SODIUM SERPL-SCNC: 138 MMOL/L (ref 135–146)
TRIGL SERPL-MCNC: 215 MG/DL

## 2025-07-17 PROCEDURE — 3008F BODY MASS INDEX DOCD: CPT | Performed by: FAMILY MEDICINE

## 2025-07-17 PROCEDURE — 99214 OFFICE O/P EST MOD 30 MIN: CPT | Performed by: FAMILY MEDICINE

## 2025-07-17 PROCEDURE — 1036F TOBACCO NON-USER: CPT | Performed by: FAMILY MEDICINE

## 2025-07-17 RX ORDER — SIMVASTATIN 10 MG/1
10 TABLET, FILM COATED ORAL NIGHTLY
Qty: 90 TABLET | Refills: 0 | Status: SHIPPED | OUTPATIENT
Start: 2025-07-17 | End: 2026-08-21

## 2025-07-17 RX ORDER — PAROXETINE 10 MG/1
10 TABLET, FILM COATED ORAL DAILY
Qty: 90 TABLET | Refills: 0 | Status: SHIPPED | OUTPATIENT
Start: 2025-07-17

## 2025-07-17 RX ORDER — PRAMIPEXOLE DIHYDROCHLORIDE 1.5 MG/1
1.5 TABLET ORAL NIGHTLY
Qty: 90 TABLET | Refills: 0 | Status: SHIPPED | OUTPATIENT
Start: 2025-07-17

## 2025-07-17 ASSESSMENT — PATIENT HEALTH QUESTIONNAIRE - PHQ9
SUM OF ALL RESPONSES TO PHQ9 QUESTIONS 1 AND 2: 0
1. LITTLE INTEREST OR PLEASURE IN DOING THINGS: NOT AT ALL
2. FEELING DOWN, DEPRESSED OR HOPELESS: NOT AT ALL

## 2025-07-17 ASSESSMENT — ENCOUNTER SYMPTOMS
OCCASIONAL FEELINGS OF UNSTEADINESS: 0
LOSS OF SENSATION IN FEET: 0
DEPRESSION: 0

## 2025-07-17 NOTE — PROGRESS NOTES
"Subjective   Patient ID: Drew Mckee is a 65 y.o. male who presents for Follow-up (EPV, F/U 6 month check, /Concern about getting vascular screen, Check PSA level/Blood Work this morning).    History of Present Illness  Drew Mckee is a 65 year old male who presents for a follow-up on his medications.    He is currently taking simvastatin, Paxil, and Mirapex. He also takes finasteride for hair loss, and Cialis for erectile dysfunction, taken as needed, with a sufficient supply.    He is HIV + and on Biktarvy; virologically suppressed under care of Dr. 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    Review of Systems  Genl: The patient has been in good health without recent weight change, fevers, or night sweats  CVS: No chest pain, irregular heartbeat, shortness of breath, or swollen ankles  Resp: No cough or difficulty breathing  GI: No change in bowel habits or abdominal pain. No heartburn  : No urinary problems.     Objective     /70 (BP Location: Left arm, Patient Position: Sitting)   Pulse 63   Temp 36.9 °C (98.5 °F) (Oral)   Resp 16   Ht 1.727 m (5' 7.99\")   Wt 78 kg (171 lb 14.4 oz)   SpO2 99%   BMI 26.14 kg/m²      Physical Exam  Alert, well-appearing.    Neck: Supple. No palpable masses. Thyroid was not enlarged.    CVS: RRR, no murmurs.     Respiratory: Clear and equal breath sounds.    GI: Soft, nontender, no masses or hepatosplenomegaly.     Lab results pending  Assessment/Plan     Diagnoses and all orders for this visit:  Mixed hyperlipidemia  -     simvastatin (Zocor) 10 mg tablet; Take 1 tablet (10 mg) by mouth once daily at bedtime.  RLS (restless legs syndrome)  -     pramipexole (Mirapex) 1.5 mg tablet; Take 1 tablet (1.5 mg) by mouth once daily at bedtime.  Anxiety  -     PARoxetine (Paxil) 10 mg tablet; Take 1 tablet (10 mg) by mouth once daily.  Health maintenance examination  -     Follow " [facility] In [facility] - PCP - Health Maintenance; Future    Assessment & Plan  Restless legs syndrome (RLS)  Managing with Mirapex.    Mixed hyperlipidemia  On simvastatin for management.    Anxiety  On Paxil.    HIV infection  Virologically suppressed under care of Dr. Jacobo. On Biktarvy.  - Continue Biktarvy as prescribed by infectious disease specialist.  - Follow up with Dr. Jacobo as scheduled.      - Schedule physical exam in January 2026      Shannan Poon MD    This medical note was created with the assistance of artificial intelligence (AI) for documentation purposes. The content has been reviewed and confirmed by the healthcare provider for accuracy and completeness. Patient consented to the use of audio recording and use of AI during their visit.

## 2025-08-25 ENCOUNTER — OFFICE VISIT (OUTPATIENT)
Dept: IMMUNOLOGY | Facility: CLINIC | Age: 65
End: 2025-08-25
Payer: COMMERCIAL

## 2025-08-25 ENCOUNTER — NUTRITION (OUTPATIENT)
Dept: IMMUNOLOGY | Facility: CLINIC | Age: 65
End: 2025-08-25

## 2025-08-25 VITALS
WEIGHT: 171.8 LBS | HEART RATE: 46 BPM | BODY MASS INDEX: 26.13 KG/M2 | DIASTOLIC BLOOD PRESSURE: 71 MMHG | OXYGEN SATURATION: 100 % | SYSTOLIC BLOOD PRESSURE: 137 MMHG

## 2025-08-25 DIAGNOSIS — B20 HUMAN IMMUNODEFICIENCY VIRUS (HIV) DISEASE (MULTI): Primary | ICD-10-CM

## 2025-08-25 LAB
BASOPHILS # BLD AUTO: 0.01 X10*3/UL (ref 0–0.1)
BASOPHILS NFR BLD AUTO: 0.2 %
EOSINOPHIL # BLD AUTO: 0.26 X10*3/UL (ref 0–0.7)
EOSINOPHIL NFR BLD AUTO: 5.2 %
ERYTHROCYTE [DISTWIDTH] IN BLOOD BY AUTOMATED COUNT: 12.6 % (ref 11.5–14.5)
HCT VFR BLD AUTO: 46.4 % (ref 41–52)
HGB BLD-MCNC: 15.4 G/DL (ref 13.5–17.5)
IMM GRANULOCYTES # BLD AUTO: 0.01 X10*3/UL (ref 0–0.7)
IMM GRANULOCYTES NFR BLD AUTO: 0.2 % (ref 0–0.9)
LYMPHOCYTES # BLD AUTO: 2.19 X10*3/UL (ref 1.2–4.8)
LYMPHOCYTES NFR BLD AUTO: 44.1 %
MCH RBC QN AUTO: 31.9 PG (ref 26–34)
MCHC RBC AUTO-ENTMCNC: 33.2 G/DL (ref 32–36)
MCV RBC AUTO: 96 FL (ref 80–100)
MONOCYTES # BLD AUTO: 0.35 X10*3/UL (ref 0.1–1)
MONOCYTES NFR BLD AUTO: 7 %
NEUTROPHILS # BLD AUTO: 2.15 X10*3/UL (ref 1.2–7.7)
NEUTROPHILS NFR BLD AUTO: 43.3 %
NRBC BLD-RTO: 0 /100 WBCS (ref 0–0)
PLATELET # BLD AUTO: 157 X10*3/UL (ref 150–450)
RBC # BLD AUTO: 4.83 X10*6/UL (ref 4.5–5.9)
WBC # BLD AUTO: 5 X10*3/UL (ref 4.4–11.3)

## 2025-08-25 PROCEDURE — 1036F TOBACCO NON-USER: CPT | Performed by: INTERNAL MEDICINE

## 2025-08-25 PROCEDURE — 99214 OFFICE O/P EST MOD 30 MIN: CPT | Performed by: INTERNAL MEDICINE

## 2025-08-25 PROCEDURE — 88185 FLOWCYTOMETRY/TC ADD-ON: CPT | Performed by: INTERNAL MEDICINE

## 2025-08-25 PROCEDURE — 36415 COLL VENOUS BLD VENIPUNCTURE: CPT | Performed by: INTERNAL MEDICINE

## 2025-08-25 PROCEDURE — 87536 HIV-1 QUANT&REVRSE TRNSCRPJ: CPT | Performed by: INTERNAL MEDICINE

## 2025-08-25 PROCEDURE — 85025 COMPLETE CBC W/AUTO DIFF WBC: CPT | Performed by: INTERNAL MEDICINE

## 2025-08-25 ASSESSMENT — PAIN SCALES - GENERAL: PAINLEVEL_OUTOF10: 0-NO PAIN

## 2025-08-26 LAB
CD3+CD4+ CELLS # BLD: 1.23 X10E9/L (ref 0.35–2.74)
CD3+CD4+ CELLS # BLD: 1226 /MM3 (ref 350–2740)
CD3+CD4+ CELLS NFR BLD: 56 % (ref 29–57)
CD3+CD4+ CELLS/CD3+CD8+ CLL BLD: 2.55 % (ref 1–3.5)
CD3+CD8+ CELLS # BLD: 0.48 X10E9/L (ref 0.08–1.49)
CD3+CD8+ CELLS NFR BLD: 22 % (ref 7–31)
HIV1 RNA # PLAS NAA DL=20: NOT DETECTED {COPIES}/ML
HIV1 RNA SPEC NAA+PROBE-LOG#: NORMAL {LOG_COPIES}/ML
LYMPHOCYTES # SPEC AUTO: 2.19 X10*3/UL

## 2026-01-23 ENCOUNTER — APPOINTMENT (OUTPATIENT)
Dept: PRIMARY CARE | Facility: CLINIC | Age: 66
End: 2026-01-23
Payer: COMMERCIAL